# Patient Record
Sex: FEMALE | Race: WHITE
[De-identification: names, ages, dates, MRNs, and addresses within clinical notes are randomized per-mention and may not be internally consistent; named-entity substitution may affect disease eponyms.]

---

## 2020-02-10 ENCOUNTER — HOSPITAL ENCOUNTER (INPATIENT)
Dept: HOSPITAL 74 - YASAS | Age: 45
LOS: 3 days | Discharge: HOME | DRG: 773 | End: 2020-02-13
Attending: ALLERGY & IMMUNOLOGY | Admitting: ALLERGY & IMMUNOLOGY
Payer: COMMERCIAL

## 2020-02-10 VITALS — BODY MASS INDEX: 25.7 KG/M2

## 2020-02-10 DIAGNOSIS — F12.20: ICD-10-CM

## 2020-02-10 DIAGNOSIS — F17.210: ICD-10-CM

## 2020-02-10 DIAGNOSIS — F13.230: ICD-10-CM

## 2020-02-10 DIAGNOSIS — F19.282: ICD-10-CM

## 2020-02-10 DIAGNOSIS — F14.20: ICD-10-CM

## 2020-02-10 DIAGNOSIS — G47.00: ICD-10-CM

## 2020-02-10 DIAGNOSIS — F11.23: Primary | ICD-10-CM

## 2020-02-10 DIAGNOSIS — F19.280: ICD-10-CM

## 2020-02-10 LAB
ALBUMIN SERPL-MCNC: 3.5 G/DL (ref 3.4–5)
ALP SERPL-CCNC: 95 U/L (ref 45–117)
ALT SERPL-CCNC: 252 U/L (ref 13–61)
ANION GAP SERPL CALC-SCNC: 5 MMOL/L (ref 8–16)
AST SERPL-CCNC: 116 U/L (ref 15–37)
BILIRUB SERPL-MCNC: 0.5 MG/DL (ref 0.2–1)
BUN SERPL-MCNC: 10.7 MG/DL (ref 7–18)
CALCIUM SERPL-MCNC: 8.7 MG/DL (ref 8.5–10.1)
CHLORIDE SERPL-SCNC: 104 MMOL/L (ref 98–107)
CO2 SERPL-SCNC: 27 MMOL/L (ref 21–32)
CREAT SERPL-MCNC: 0.8 MG/DL (ref 0.55–1.3)
DEPRECATED RDW RBC AUTO: 15.8 % (ref 11.6–15.6)
GLUCOSE SERPL-MCNC: 113 MG/DL (ref 74–106)
HCT VFR BLD CALC: 43.2 % (ref 32.4–45.2)
HGB BLD-MCNC: 15 GM/DL (ref 10.7–15.3)
MCH RBC QN AUTO: 32.1 PG (ref 25.7–33.7)
MCHC RBC AUTO-ENTMCNC: 34.8 G/DL (ref 32–36)
MCV RBC: 92.3 FL (ref 80–96)
PLATELET # BLD AUTO: 218 K/MM3 (ref 134–434)
PMV BLD: 8.5 FL (ref 7.5–11.1)
POTASSIUM SERPLBLD-SCNC: 4 MMOL/L (ref 3.5–5.1)
PROT SERPL-MCNC: 6.8 G/DL (ref 6.4–8.2)
RBC # BLD AUTO: 4.68 M/MM3 (ref 3.6–5.2)
SODIUM SERPL-SCNC: 136 MMOL/L (ref 136–145)
WBC # BLD AUTO: 5.8 K/MM3 (ref 4–10)

## 2020-02-10 PROCEDURE — HZ2ZZZZ DETOXIFICATION SERVICES FOR SUBSTANCE ABUSE TREATMENT: ICD-10-PCS | Performed by: ALLERGY & IMMUNOLOGY

## 2020-02-10 RX ADMIN — HYDROXYZINE PAMOATE PRN MG: 25 CAPSULE ORAL at 22:38

## 2020-02-10 RX ADMIN — HYDROXYZINE PAMOATE PRN MG: 25 CAPSULE ORAL at 16:58

## 2020-02-10 RX ADMIN — NICOTINE SCH MG: 21 PATCH TRANSDERMAL at 17:01

## 2020-02-10 RX ADMIN — Medication SCH MG: at 22:39

## 2020-02-10 NOTE — HP
COWS





- Scale


Resting Pulse: 0= [place] 80 or Below


Sweatin= Chills/Flushing


Restless Observation: 1= Difficult to Sit Still


Pupil Size: 0= Normal to Room Light


Bone or Joint Aches: 2= Severe Diffuse Aches


Runny Nose/ Eye Tearin= Runny Nose/Eyes


GI Upset > 30mins: 2= Nausea/Diarrhea


Tremor Observation: 1= Tremor Felt, Not Seen


Yawning Observation: 0= None


Anxiety or Irritability: 2=Irritable/Anxious


Goose Flesh Skin: 0=Smooth Skin


COWS Score: 11





CIWA Score





- Admission Criteria


[facility] Guidelines: Admission for Medically Managed Detox: 


Requires at least one of the followin. CIWA greater than 12


2. Seizures within the past 24 hours


3. Delirium tremens within the past 24 hours


4. Hallucinations within the past 24 hours


5. Acute intervention needed for co  occurring medical disorder


6. Acute intervention needed for co  occurring psychiatric disorder


7. Severe withdrawal that cannot be handled at a lower level of care (continued


    vomiting, continued diarrhea, abnormal vital signs) requiring intravenous


    medication and/or fluids


8. Pregnancy








Admitting History and Physical





- Admission


Chief Complaint: Ms. Camilo presents to [facility] requesting admission to detox 

from Fentanyl and Heroin.


History of Present Illness: 





Ms. Camilo presents to [facility] requesting admission to detox from Fentanyl 

and Heroin. 


She is a 43 yo woman with 2 prior admissions to this facility, the last was in 

2018.





PMH: none


Psych: anxiety/depression, on Klonopin (street), was on Ambien





Substance use History


Heroin: first use at age 39 y, last use: this am, quantity: 1.5 bundles per 

day.  IV.  3 ODs, last OD ~ 5 mos ago, tx in [facility]


Cocaine: first use at age 11 y, last use 3 days ago, one bag daily


Marijuana: first use at age 11 y, last use 4 days ago, 10 blunts per day


Benzodiazepines: first use at age 42 y, last use yesterday, $40. per day.  

Klonopin


Nicotine: 1.5 ppd





Denies: no alcohol, no methadone


History Source: Patient


Limitations to Obtaining History: No Limitations





- Past Medical History


...LMP: 16


Psych: Yes: Anxiety, Depression





- Past Surgical History


Additional Past Surgical History: 





Hemorrhoids








- Smoking History


Smoking history: Current every day smoker


Have you smoked in the past 12 months: Yes


Aproximately how many cigarettes per day: 30





- Alcohol/Substance Use


Hx Alcohol Use: Yes


History of Substance Use: reports: Cocaine, Heroin, Marijuana, Tranquilizers





- Social History


Usual Living Arrangement: Yes: Other (friend)


History of Recent Travel: No





Admission ROS [facility]





- [facility]


Allergies/Adverse Reactions: 


 Allergies











Allergy/AdvReac Type Severity Reaction Status Date / Time


 


No Known Allergies Allergy   Verified 18 18:27











Exam Limitations: No Limitations





- Ebola screening


Have you traveled outside of the country in the last 21 days: No


Have you had contact with anyone from an Ebola affected area: No


Have you been sick,other than usual withdrawal symptoms: No


Do you have a fever: No





- Review of Systems


Constitutional: No Symptoms Reported, Fever, Unintentional Wgt. Loss (7 lbs 

loss in 4 weeks)


EENT: reports: No Symptoms Reported


Respiratory: reports: Cough


Cardiac: reports: No Symptoms Reported


GI: reports: Diarrhea


: reports: No Symptoms Reported


Musculoskeletal: reports: Back Pain, Joint Pain


Integumentary: reports: Dryness


Neuro: reports: Headache, Numbness


Endocrine: reports: No Symptoms Reported


Hematology: reports: No Symptoms Reported


Psychiatric: reports: Anxious, Depressed





Patient History





- Patient Medical History


Hx Anemia: No


Hx Asthma: No


Hx Chronic Obstructive Pulmonary Disease (COPD): No


Hx Cancer: No


Hx Cardiac Disorders: No


Hx Congestive Heart Failure: No


Hx Hypertension: No


Hx Hypercholesterolemia: No


Hx Pacemaker: No


HX Cerebrovascular Accident: No


Hx Seizures: No


Hx Dementia: No


Hx Diabetes: No


Hx Gastrointestinal Disorders: No


Hx Liver Disease: No


Hx Genitourinary Disorders: No


Hx Sexually Transmitted Disorders: No


Hx Renal Disease (ESRD): No


Hx Thyroid Disease: No


Hx Human Immunodeficiency Virus (HIV): No (4 weeks ago at [facility], negative)


Hx Hepatitis C: No


Hx Depression: Yes (& anxiety,prescribed Abilify(non-compliant))


Hx Suicide Attempt: No


Hx Bipolar Disorder: No


Hx Schizophrenia: No





- Patient Surgical History


Past Surgical History: No


Hx Neurologic Surgery: No


Hx Cataract Extraction: No


Hx Cardiac Surgery: No


Hx Lung Surgery: No


Hx Breast Surgery: No


Hx Breast Biopsy: No


Hx Abdominal Surgery: No


Hx Appendectomy: No


Hx Cholecystectomy: No


Hx Genitourinary Surgery: No


Hx  Section: No


Hx Orthopedic Surgery: No


Other Surgical History: Hemorrhoids


Anesthesia Reaction: No





- PPD History


Date: 16


PPD to be Administered?: Yes





- Reproductive History


Patient is a Female of Child Bearing Age (11 -55 yrs old): Yes


Last Menstrual Period: 18





- Smoking Cessation


Smoking history: Current every day smoker


Have you smoked in the past 12 months: Yes


Aproximately how many cigarettes per day: 20


Hx Chewing Tobacco Use: No


Initiated information on smoking cessation: Yes


'Breaking Loose' booklet given: 02/10/20





- Substance & Tx. History


Substance Use Type: Cocaine, Heroin, Marijuana, Tranquilizers





- Substances abused


  ** Heroin


Substance route: Injection


Frequency: Daily


Amount used: 1.5 bundles


Age of first use: 39


Date of last use: 02/10/20





  ** Cocaine


Frequency: Daily


Amount used: $40


Age of first use: 11


Date of last use: 19





  ** Marijuana/Hashish


Frequency: Daily


Amount used: 4 blunts


Age of first use: 11


Date of last use: 19





  ** Benzodiazepine (Klonopin)


Substance route: Oral


Amount used: $40 per day


Age of first use: 19


Date of last use: 19





Admission Physical Exam [facility]





- Physical


General Appearance: Yes: No Apparent Distress


HEENTM: Yes: EOMI, Hearing grossly Normal, Normocephalic, Normal Voice


Respiratory: Yes: Lungs Clear, Normal Breath Sounds


Neck: Yes: Within Normal Limits


Breast: Yes: Breast Exam Deferred


Cardiology: Yes: Regular Rate, S1, S2


Abdominal: Yes: Normal Bowel Sounds, Non Tender, Flat


Genitourinary: Yes: Other (deferred)


Back: Yes: Normal Inspection


Musculoskeletal: Yes: Within Normal Limits


Extremities: Yes: Within Normal Limits


Neurological: Yes: Alert


Integumentary: Yes: Track Marks





- Diagnostic


(1) Benzodiazepine abuse


Current Visit: Yes   Status: Acute   





(2) Cannabis dependence, uncomplicated


Current Visit: Yes   Status: Chronic   





(3) Cocaine dependence, uncomplicated


Current Visit: Yes   Status: Acute   





(4) Opioid dependence with withdrawal


Current Visit: Yes   Status: Acute   





Cleared for Admission S





- Detox or Rehab


[facility] Level of Care: Medically Managed





Inpatient Rehab Admission





- Rehab Decision to Admit


Inpatient rehab admission?: No

## 2020-02-11 RX ADMIN — Medication SCH MG: at 22:01

## 2020-02-11 RX ADMIN — NICOTINE SCH: 21 PATCH TRANSDERMAL at 09:41

## 2020-02-11 RX ADMIN — METHOCARBAMOL PRN MG: 500 TABLET ORAL at 09:52

## 2020-02-11 RX ADMIN — Medication SCH: at 09:41

## 2020-02-11 RX ADMIN — IBUPROFEN PRN MG: 400 TABLET, FILM COATED ORAL at 09:52

## 2020-02-11 RX ADMIN — SUVOREXANT PRN MG: 10 TABLET, FILM COATED ORAL at 22:02

## 2020-02-11 NOTE — PN
[facility] COWS





- Scale


Resting Pulse: 0= [place] 80 or Below


Sweatin=Flushed/Facial Moisture


Restless Observation: 1= Difficult to Sit Still


Pupil Size: 0= Normal to Room Light


Bone or Joint Aches: 2= Severe Diffuse Aches


Runny Nose/ Eye Tearin= Runny Nose/Eyes


GI Upset > 30mins: 2= Nausea/Diarrhea


Tremor Observation of Outstretched Hands: 2= Slight Tremor Visible


Yawning Observation: 1= 1-2x During Session


Anxiety or Irritability: 2=Irritable/Anxious


Goose Flesh Skin: 3=Piloerection


COWS Score: 17





BHS Progress Note (SOAP)


Subjective: 





nausea/vomiting/dry heaves


sweats


restless


shakes


interrupted sleep


agitation


body aches





Objective: 





20 12:14


 Vital Signs











Temperature  97.9 F   20 09:37


 


Pulse Rate  60   20 09:37


 


Respiratory Rate  19   20 09:37


 


Blood Pressure  156/82   20 09:37


 


O2 Sat by Pulse Oximetry (%)      








 Laboratory Tests











  02/10/20 02/10/20





  15:15 15:15


 


WBC  5.8 


 


RBC  4.68 


 


Hgb  15.0 


 


Hct  43.2 


 


MCV  92.3 


 


MCH  32.1 


 


MCHC  34.8 


 


RDW  15.8 H 


 


Plt Count  218  D 


 


MPV  8.5 


 


Sodium   136


 


Potassium   4.0


 


Chloride   104


 


Carbon Dioxide   27


 


Anion Gap   5 L


 


BUN   10.7


 


Creatinine   0.8


 


Est GFR (CKD-EPI)AfAm   103.19


 


Est GFR (CKD-EPI)NonAf   89.04


 


Random Glucose   113 H


 


Calcium   8.7


 


Total Bilirubin   0.5


 


AST   116 H


 


ALT   252 H


 


Alkaline Phosphatase   95


 


Total Protein   6.8


 


Albumin   3.5








labs noted


elevated ast/alt;d/c tylenol and repeat labs


aaox3


lying in bed


no acute distress


Assessment: 





20 12:15


withdrawals


Plan: 





methadone dose only for today was switch from po to IM. 


tigan IM 


tigan PO if IM not tolerated


roboxin 


clonidine prn


valium 10mg q4hrs x 3 days only

## 2020-02-11 NOTE — CONSULT
[facility] Psychiatric Consult





- Data


Date of interview: 02/11/20


Admission source: Self-referred


Identifying data: Ms Camilo is a 44 years old single Caucasuan female, 

unemployed with no source of income, homeless seeking detox treatment for opioid

, cocaine, benzodiazepine and cannabis


Substance Abuse History: Reports history of heroin, fentanyl, cocaine, klonopin 

and marijuana use. Refer to addiction counselor's summary for further 

information


Medical History: Unremarkable. Smokes cigarettes 1 ppd


Psychiatric History: Reports that she has received treament for anxiety and 

insomnia in the past. She was prescribed Xanax, Trazadone and Ambien. Denies 

previous psychiatric hospitalization or suicidal attempt as well as current 

psychiatric treatment. At present, reports feeling anxious and sleeping poorly


Physical/Sexual Abuse/Trauma History: Denies history of abuse as a child or DV 

relationship as an adult





Mental Status Exam





- Mental Status Exam


Alert and Oriented to: Time, Place, Person


Cognitive Function: Fair


Patient Appearance: Well Groomed


Mood: Anxious


Affect: Appropriate


Patient Behavior: Cooperative (superficially)


Speech Pattern: Clear


Voice Loudness: Normal


Thought Process: Intact, Goal Oriented


Hallucinations: Denies


Suicidal Ideation: Denies


Homicidal Ideation: Denies


Insight/Judgement: Poor


Sleep: Poorly


Appetite: Poor


Muscle strength/Tone: Normal


Gait/Station: Normal





Psychiatric Findings





- Problem List (Axis 1, 2,3)


(1) Substance-induced anxiety disorder


Current Visit: Yes   Status: Acute   





(2) Substance-induced sleep disorder


Current Visit: Yes   Status: Acute   





(3) Opioid dependence with withdrawal


Current Visit: Yes   Status: Acute   





(4) Cocaine dependence, uncomplicated


Current Visit: Yes   Status: Acute   





(5) Uncomplicated sedative, hypnotic or anxiolytic withdrawal


Current Visit: No   Status: Acute   





(6) Cannabis dependence, uncomplicated


Current Visit: Yes   Status: Acute   





(7) Nicotine dependence


Current Visit: No   Status: Chronic   


Qualifiers: 


   Nicotine product type: cigarettes 





- Initial Treatment Plan


Initial Treatment Plan: 1) Start Vistaril 50 mg po Q 4hrs prn for anxiet and 

Belsomra 10 mg po HS prn for insomnia.  2) Continue inpatient detoxification

## 2020-02-12 RX ADMIN — NICOTINE SCH MG: 21 PATCH TRANSDERMAL at 10:31

## 2020-02-12 RX ADMIN — SUVOREXANT PRN MG: 10 TABLET, FILM COATED ORAL at 22:53

## 2020-02-12 RX ADMIN — Medication SCH MG: at 22:51

## 2020-02-12 RX ADMIN — METHOCARBAMOL PRN MG: 500 TABLET ORAL at 18:12

## 2020-02-12 RX ADMIN — Medication SCH TAB: at 10:31

## 2020-02-12 NOTE — PN
[facility] COWS





- Scale


Resting Pulse: 0= [place] 80 or Below


Sweatin= Chills/Flushing


Restless Observation: 1= Difficult to Sit Still


Pupil Size: 0= Normal to Room Light


Bone or Joint Aches: 2= Severe Diffuse Aches


Runny Nose/ Eye Tearin= Nasal Congestion


GI Upset > 30mins: 0= None


Tremor Observation of Outstretched Hands: 1= Tremor Felt, Not Seen


Yawning Observation: 2= >3x During Session


Anxiety or Irritability: 2=Irritable/Anxious


Goose Flesh Skin: 0=Smooth Skin


COWS Score: 10





BHS Progress Note (SOAP)


Subjective: 





sweats


restless


body aches


no more vomiting





Objective: 





20 11:31


 Vital Signs











Temperature  97.9 F   20 09:20


 


Pulse Rate  80   20 09:20


 


Respiratory Rate  18   20 09:20


 


Blood Pressure  146/98   20 09:20


 


O2 Sat by Pulse Oximetry (%)      








 Laboratory Tests











  02/10/20 02/10/20 02/10/20





  15:12 15:15 15:15


 


WBC   5.8 


 


RBC   4.68 


 


Hgb   15.0 


 


Hct   43.2 


 


MCV   92.3 


 


MCH   32.1 


 


MCHC   34.8 


 


RDW   15.8 H 


 


Plt Count   218  D 


 


MPV   8.5 


 


Sodium    136


 


Potassium    4.0


 


Chloride    104


 


Carbon Dioxide    27


 


Anion Gap    5 L


 


BUN    10.7


 


Creatinine    0.8


 


Est GFR (CKD-EPI)AfAm    103.19


 


Est GFR (CKD-EPI)NonAf    89.04


 


Random Glucose    113 H


 


Calcium    8.7


 


Total Bilirubin    0.5


 


AST    116 H


 


ALT    252 H


 


Alkaline Phosphatase    95


 


Total Protein    6.8


 


Albumin    3.5


 


POC Urine HCG, Qual  Negative  


 


RPR Titer   














  02/10/20





  15:15


 


WBC 


 


RBC 


 


Hgb 


 


Hct 


 


MCV 


 


MCH 


 


MCHC 


 


RDW 


 


Plt Count 


 


MPV 


 


Sodium 


 


Potassium 


 


Chloride 


 


Carbon Dioxide 


 


Anion Gap 


 


BUN 


 


Creatinine 


 


Est GFR (CKD-EPI)AfAm 


 


Est GFR (CKD-EPI)NonAf 


 


Random Glucose 


 


Calcium 


 


Total Bilirubin 


 


AST 


 


ALT 


 


Alkaline Phosphatase 


 


Total Protein 


 


Albumin 


 


POC Urine HCG, Qual 


 


RPR Titer  Nonreactive








aaox3


ambulating


no acute distress


Assessment: 





20 11:31


withdrawals


Plan: 





continue detox


increase fluids

## 2020-02-13 VITALS — SYSTOLIC BLOOD PRESSURE: 123 MMHG | DIASTOLIC BLOOD PRESSURE: 72 MMHG | HEART RATE: 84 BPM

## 2020-02-13 VITALS — TEMPERATURE: 97.7 F

## 2020-02-13 RX ADMIN — Medication SCH TAB: at 11:01

## 2020-02-13 RX ADMIN — IBUPROFEN PRN MG: 400 TABLET, FILM COATED ORAL at 06:42

## 2020-02-13 RX ADMIN — METHOCARBAMOL PRN MG: 500 TABLET ORAL at 06:37

## 2020-02-13 RX ADMIN — NICOTINE SCH MG: 21 PATCH TRANSDERMAL at 11:01

## 2020-02-13 NOTE — DS
[facility] Detox Discharge Summary


Admission Date: 


02/10/20





Discharge Date: 02/13/20





- History


Present History: Alcohol Dependence, Cannabis Dependence, Cocaine Dependence, 

Opioid Dependence


Pertinent Past History: 





Patient alert and oriented x3 








- Physical Exam Results


Vital Signs: 


 Vital Signs











Temperature  97.7 F   02/13/20 10:26


 


Pulse Rate  84   02/13/20 10:26


 


Respiratory Rate  16   02/13/20 10:26


 


Blood Pressure  123/72   02/13/20 10:26


 


O2 Sat by Pulse Oximetry (%)      











Pertinent Admission Physical Exam Findings: 





Cor:  S1, S2 no murmurs


Lungs: Clear to Auscultation


Abd:  Benign, normal bowel sounds.





- Treatment


Hospital Course: Detox Protocol Followed, Detoxed Safely, Discharged Condition 

Good


Patient has Accepted a Rehab Referral to: self-referral to [facility] which is 

close to her home





- Medication


Discharge Medications: 


Ambulatory Orders





NK [No Known Home Medication]  02/26/18

## 2024-10-02 ENCOUNTER — HOSPITAL ENCOUNTER (EMERGENCY)
Dept: HOSPITAL 74 - JER | Age: 49
Discharge: LEFT BEFORE DISCHARGE | End: 2024-10-02
Payer: COMMERCIAL

## 2024-10-02 VITALS — DIASTOLIC BLOOD PRESSURE: 74 MMHG | SYSTOLIC BLOOD PRESSURE: 126 MMHG | HEART RATE: 76 BPM

## 2024-10-02 VITALS — BODY MASS INDEX: 21.4 KG/M2

## 2024-10-02 VITALS — TEMPERATURE: 97.9 F | RESPIRATION RATE: 18 BRPM

## 2024-10-02 DIAGNOSIS — Z20.822: ICD-10-CM

## 2024-10-02 DIAGNOSIS — L02.512: Primary | ICD-10-CM

## 2024-10-02 DIAGNOSIS — L08.9: ICD-10-CM

## 2024-10-02 DIAGNOSIS — I95.9: ICD-10-CM

## 2024-10-02 LAB
ALBUMIN SERPL-MCNC: 3.9 G/DL (ref 3.4–5)
ALP SERPL-CCNC: 93 U/L (ref 45–117)
ALT SERPL-CCNC: 14 U/L (ref 13–61)
ANION GAP SERPL CALC-SCNC: 4 MMOL/L (ref 4–13)
APTT BLD: 36.9 SECONDS (ref 25.2–36.5)
AST SERPL-CCNC: 15 U/L (ref 15–37)
BASE EXCESS BLDV CALC-SCNC: 2.8 MMOL/L (ref -2–2)
BASOPHILS # BLD: 0.3 % (ref 0–2)
BILIRUB SERPL-MCNC: 0.9 MG/DL (ref 0.2–1)
BUN SERPL-MCNC: 11.1 MG/DL (ref 7–18)
CALCIUM SERPL-MCNC: 9.3 MG/DL (ref 8.5–10.1)
CHLORIDE SERPL-SCNC: 99 MMOL/L (ref 98–107)
CO2 SERPL-SCNC: 32 MMOL/L (ref 21–32)
CREAT SERPL-MCNC: 0.7 MG/DL (ref 0.55–1.3)
DEPRECATED RDW RBC AUTO: 13.1 % (ref 11.6–15.6)
EOSINOPHIL # BLD: 0.6 % (ref 0–4.5)
GLUCOSE SERPL-MCNC: 68 MG/DL (ref 74–106)
HCT VFR BLD CALC: 39.4 % (ref 32.4–45.2)
HCT VFR BLDV CALC: 44 % (ref 32.4–45.2)
HGB BLD-MCNC: 13.4 GM/DL (ref 10.7–15.3)
INR BLD: 1.17 (ref 0.83–1.09)
LYMPHOCYTES # BLD: 15.3 % (ref 8–40)
MCH RBC QN AUTO: 30.9 PG (ref 25.7–33.7)
MCHC RBC AUTO-ENTMCNC: 34.1 G/DL (ref 32–36)
MCV RBC: 90.6 FL (ref 80–96)
MONOCYTES # BLD AUTO: 5.3 % (ref 3.8–10.2)
NEUTROPHILS # BLD: 78.5 % (ref 42.8–82.8)
PCO2 BLDV: 56.5 MMHG (ref 38–52)
PH BLDV: 7.34 [PH] (ref 7.31–7.41)
PLATELET # BLD AUTO: 300 10^3/UL (ref 134–434)
PMV BLD: 8 FL (ref 7.5–11.1)
POTASSIUM SERPLBLD-SCNC: 3.9 MMOL/L (ref 3.5–5.1)
PROT SERPL-MCNC: 7.6 G/DL (ref 6.4–8.2)
PT PNL PPP: 13.4 SEC (ref 9.7–13)
RBC # BLD AUTO: 4.35 M/MM3 (ref 3.6–5.2)
SAO2 % BLDV: 71.6 % (ref 70–80)
SODIUM SERPL-SCNC: 134 MMOL/L (ref 136–145)
WBC # BLD AUTO: 11.5 K/MM3 (ref 4–10)

## 2024-10-02 RX ADMIN — SODIUM CHLORIDE STA ML: 9 INJECTION, SOLUTION INTRAVENOUS at 20:28

## 2024-10-02 RX ADMIN — CEFEPIME HYDROCHLORIDE ONE GM: 2 INJECTION, POWDER, FOR SOLUTION INTRAVENOUS at 21:22

## 2024-10-02 RX ADMIN — VANCOMYCIN HYDROCHLORIDE ONE: 1 INJECTION, POWDER, LYOPHILIZED, FOR SOLUTION INTRAVENOUS at 22:28

## 2024-10-02 RX ADMIN — PIPERACILLIN AND TAZOBACTAM ONE: 4; .5 INJECTION, POWDER, LYOPHILIZED, FOR SOLUTION INTRAVENOUS at 20:38

## 2024-10-02 RX ADMIN — CLINDAMYCIN IN 5 PERCENT DEXTROSE ONE MLS/HR: 18 INJECTION, SOLUTION INTRAVENOUS at 20:46

## 2025-04-05 ENCOUNTER — HOSPITAL ENCOUNTER (EMERGENCY)
Dept: HOSPITAL 74 - JERFT | Age: 50
Discharge: HOME | End: 2025-04-05
Payer: COMMERCIAL

## 2025-04-05 VITALS
RESPIRATION RATE: 18 BRPM | HEART RATE: 71 BPM | SYSTOLIC BLOOD PRESSURE: 149 MMHG | DIASTOLIC BLOOD PRESSURE: 87 MMHG | TEMPERATURE: 98 F

## 2025-04-05 VITALS — BODY MASS INDEX: 23.1 KG/M2

## 2025-04-05 DIAGNOSIS — R21: ICD-10-CM

## 2025-04-05 DIAGNOSIS — L30.9: Primary | ICD-10-CM

## 2025-04-05 RX ADMIN — FAMOTIDINE ONE MG: 20 TABLET ORAL at 21:01

## 2025-04-05 RX ADMIN — DIPHENHYDRAMINE HCL ONE MG: 25 CAPSULE ORAL at 21:01
